# Patient Record
Sex: FEMALE | Race: WHITE | NOT HISPANIC OR LATINO | Employment: FULL TIME | URBAN - METROPOLITAN AREA
[De-identification: names, ages, dates, MRNs, and addresses within clinical notes are randomized per-mention and may not be internally consistent; named-entity substitution may affect disease eponyms.]

---

## 2021-12-20 PROBLEM — M85.89 OSTEOPENIA OF MULTIPLE SITES: Status: ACTIVE | Noted: 2021-12-20

## 2021-12-20 PROBLEM — C50.911 MALIGNANT NEOPLASM OF RIGHT FEMALE BREAST (HCC): Status: ACTIVE | Noted: 2021-12-20

## 2021-12-20 PROBLEM — M15.0 PRIMARY GENERALIZED (OSTEO)ARTHRITIS: Status: ACTIVE | Noted: 2021-12-20

## 2021-12-20 PROBLEM — I73.00 RAYNAUD'S PHENOMENON WITHOUT GANGRENE: Status: ACTIVE | Noted: 2021-12-20

## 2021-12-20 PROBLEM — M35.00 SJOGREN'S SYNDROME WITHOUT EXTRAGLANDULAR INVOLVEMENT (HCC): Status: ACTIVE | Noted: 2021-12-20

## 2022-06-23 PROBLEM — M18.0 PRIMARY OSTEOARTHRITIS OF BOTH FIRST CARPOMETACARPAL JOINTS: Status: ACTIVE | Noted: 2022-06-23

## 2023-07-06 PROBLEM — Z79.811 LONG TERM (CURRENT) USE OF AROMATASE INHIBITORS: Status: ACTIVE | Noted: 2023-07-06

## 2024-01-09 ENCOUNTER — TELEPHONE (OUTPATIENT)
Age: 59
End: 2024-01-09

## 2024-01-12 ENCOUNTER — TELEPHONE (OUTPATIENT)
Age: 59
End: 2024-01-12

## 2024-01-12 DIAGNOSIS — M81.0 SENILE OSTEOPOROSIS: ICD-10-CM

## 2024-01-12 NOTE — TELEPHONE ENCOUNTER
Caller: Marielos @ [facility]     Doctor: Delmis Napier      Reason for call: Marielos calling in and was told last month not to send Prolia injection over to the [place] office . But the patient called the Pharmacy for her injection and wants to continue to use [facility] pharmacy .    Please reach out to Marielos at the [facility]       Call back#: [phone]

## 2024-01-16 NOTE — TELEPHONE ENCOUNTER
Marielos from [facility] called asking for a further update on this matter for patients Prolia injection. She would appreciate your return call as soon as possible. Thank you    Marielos  #[phone]

## 2024-01-17 RX ORDER — DENOSUMAB 60 MG/ML
60 INJECTION SUBCUTANEOUS
Qty: 1 ML | Refills: 1 | Status: SHIPPED | OUTPATIENT
Start: 2024-01-17 | End: 2024-07-16

## 2024-01-17 NOTE — TELEPHONE ENCOUNTER
Caller: Shai     Doctor: Field     Reason for call: Shai called in regarding her appointment and her medication .  Shai is pretty upset that she is scheduled tomorrow and she's having issues getting her Prolia medication . I reached out to the clinical team and sharon was able to take the call and answer shai questions

## 2024-01-17 NOTE — TELEPHONE ENCOUNTER
Patient is calling to let us know that they wanted to know if their prolia shot is at the office please advise the patient

## 2024-01-17 NOTE — TELEPHONE ENCOUNTER
Spoke with Bayron and explained the situation. Informed patient she is able to keep her appointment tomorrow.

## 2024-01-17 NOTE — TELEPHONE ENCOUNTER
Called pharmacy and spoke with Marielos - she states the original prescription from December was cancel because they were informed we couldn't accept [facility] patient. I did inform her that we are able to accept the medication if it was shipped to our office.   She is requesting a new prescription sent over so they can process. The medication. Once the med is escribed I will call the patient.    Requested Prescriptions     Pending Prescriptions Disp Refills    denosumab (Prolia) 60 mg/mL 1 mL 1     Sig: Inject 1 mL (60 mg total) under the skin every 6 (six) months for 2 doses

## 2024-01-18 ENCOUNTER — OFFICE VISIT (OUTPATIENT)
Age: 59
End: 2024-01-18

## 2024-01-18 VITALS
TEMPERATURE: 97.3 F | BODY MASS INDEX: 20.79 KG/M2 | OXYGEN SATURATION: 97 % | DIASTOLIC BLOOD PRESSURE: 70 MMHG | WEIGHT: 140.4 LBS | HEIGHT: 69 IN | HEART RATE: 69 BPM | SYSTOLIC BLOOD PRESSURE: 114 MMHG

## 2024-01-18 DIAGNOSIS — M18.0 PRIMARY OSTEOARTHRITIS OF BOTH FIRST CARPOMETACARPAL JOINTS: ICD-10-CM

## 2024-01-18 DIAGNOSIS — Z79.811 LONG TERM (CURRENT) USE OF AROMATASE INHIBITORS: ICD-10-CM

## 2024-01-18 DIAGNOSIS — C50.911 MALIGNANT NEOPLASM OF RIGHT FEMALE BREAST, UNSPECIFIED ESTROGEN RECEPTOR STATUS, UNSPECIFIED SITE OF BREAST (HCC): ICD-10-CM

## 2024-01-18 DIAGNOSIS — I73.00 RAYNAUD'S PHENOMENON WITHOUT GANGRENE: ICD-10-CM

## 2024-01-18 DIAGNOSIS — M15.0 PRIMARY GENERALIZED (OSTEO)ARTHRITIS: ICD-10-CM

## 2024-01-18 DIAGNOSIS — M35.00 SJOGREN'S SYNDROME WITHOUT EXTRAGLANDULAR INVOLVEMENT (HCC): ICD-10-CM

## 2024-01-18 DIAGNOSIS — M85.89 OSTEOPENIA OF MULTIPLE SITES: Primary | ICD-10-CM

## 2024-01-18 RX ORDER — MIRABEGRON 50 MG/1
50 TABLET, FILM COATED, EXTENDED RELEASE ORAL DAILY
COMMUNITY
Start: 2023-09-14 | End: 2024-09-13

## 2024-01-18 RX ORDER — ANASTROZOLE 1 MG/1
1 TABLET ORAL DAILY
COMMUNITY
Start: 2023-10-18

## 2024-01-18 NOTE — TELEPHONE ENCOUNTER
[facility] called in regards to Pts Prolia Injection. I transferred the call directly to the [place] Office. Thank you.

## 2024-01-18 NOTE — PROGRESS NOTES
"Assessment/Plan:    Bayron Parsons came into the [facility] today 01/18/24 to receive Prolia injection.      Verbal consent obtained.  Consent given by: patient    patient states patient has been medically healthy with no underlining concerns/complications.      Bayron Parsons presents with n/a symptoms today.       All insturctions were reviewed with the patient.    If the patient should have any questions/concerns, advised patient to contacted [facility].       Subjective:     History provided by: patient    Patient ID: Bayron Parsons is a 58 y.o. female      Objective:    Vitals:    01/18/24 0731   BP: 114/70   BP Location: Left arm   Patient Position: Sitting   Cuff Size: Adult   Pulse: 69   Temp: (!) 97.3 °F (36.3 °C)   TempSrc: Tympanic   SpO2: 97%   Weight: 63.7 kg (140 lb 6.4 oz)   Height: 5' 8.5\" (1.74 m)       Patient tolerated the injection well without any complications.  Injection site/s left upper arm.  Medication was provided by Specialty pharmacy - [facility].    Patient signed consent form yes   Patient signed ABN form yes (If no patient is not a medicare patient).   Patient waited 15 minutes after injection yes (This only applies to patient's receiving first time injection).       Last Visit: 1/9/2024  Next visit:1/18/2024    "

## 2024-01-18 NOTE — PATIENT INSTRUCTIONS
Call the office if you need thumb injections.  Otherwise I will see you yearly and you can follow-up with the medical assistant for your every 6-month Prolia.  Get lab work 2 weeks before the office visit.

## 2024-01-20 NOTE — ASSESSMENT & PLAN NOTE
Sicca symptoms persist.  She is using Restasis for dry eye syndrome and PreviDent toothpaste with Biotene gel at night for xerostomia.  She does follow-up with the dentist every 4 months.  Continue to monitor for additional signs and symptoms of connective tissue disease.  Note complete MOISES profile negative 12/15/2022.

## 2024-01-20 NOTE — ASSESSMENT & PLAN NOTE
Raynaud's stable with no digital ulcerations.  Encourage cold protection.  Follow-up comprehensive MOISES profile was negative.  Continue to monitor.

## 2024-01-20 NOTE — ASSESSMENT & PLAN NOTE
Continue Prolia for a total of 10 years while on aromatase inhibitor being used for breast cancer prevention.  Monitor osteopenia with BMD every 2 years.  Most recent DEXA 11/30/2023 normal.  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  Continue calcium and vitamin D supplement and home exercise program as tolerated.  Monitor serum calcium and kidney function prior to each Prolia injection.  She is due for Prolia injection at this visit.  Follow-up 6 months with medical assistant for next Prolia injection with plans for follow-up office visit with Prolia injection in 12 months.

## 2024-01-20 NOTE — ASSESSMENT & PLAN NOTE
Patient continues on aromatase inhibitor with plans for 10 years of treatment.  She will continue Prolia while she is on aromatase inhibitor with plans to transition off once treatment is completed with aromatase inhibitor.  We will use bisphosphonate to transition off Prolia at the time of completion of aromatase inhibitor therapy.  Follow-up oncology as scheduled.

## 2024-01-20 NOTE — ASSESSMENT & PLAN NOTE
Continue Arimidex to complete 10 years of therapy.  Continue Prolia for prevention of bone loss while on Arimidex.  Continue to monitor her BMD every 2 years.  She will be due in November 2025. Continue calcium and vitamin D supplement.  Continue home exercise program as tolerated.

## 2024-01-20 NOTE — ASSESSMENT & PLAN NOTE
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  Symptoms have been relatively stable since her last set of injections in March 2023.  Patient will call should she need steroid injection CMC joints.  Continue symptomatic relief as needed.  Continue to monitor.

## 2024-04-29 ENCOUNTER — TELEPHONE (OUTPATIENT)
Dept: RHEUMATOLOGY | Facility: CLINIC | Age: 59
End: 2024-04-29

## 2024-06-28 DIAGNOSIS — M81.0 SENILE OSTEOPOROSIS: ICD-10-CM

## 2024-06-28 RX ORDER — DENOSUMAB 60 MG/ML
60 INJECTION SUBCUTANEOUS
Qty: 1 ML | Refills: 1 | Status: SHIPPED | OUTPATIENT
Start: 2024-06-28 | End: 2024-12-26

## 2024-07-02 DIAGNOSIS — M81.0 SENILE OSTEOPOROSIS: ICD-10-CM

## 2024-07-02 RX ORDER — DENOSUMAB 60 MG/ML
60 INJECTION SUBCUTANEOUS
Qty: 1 ML | Refills: 1 | OUTPATIENT
Start: 2024-07-02 | End: 2024-12-30

## 2024-07-02 NOTE — TELEPHONE ENCOUNTER
Called patient & left message - needs to change her appointment date from 7/22 to 7/23. No MA in office on 7/22 for injections.

## 2024-07-02 NOTE — TELEPHONE ENCOUNTER
No auth is req for prolia please send script into pharmacy for up coming appt on 7/22/24.     Requested Prescriptions     Pending Prescriptions Disp Refills    denosumab (Prolia) 60 mg/mL 1 mL 1     Sig: Inject 1 mL (60 mg total) under the skin every 6 (six) months for 2 doses

## 2024-07-02 NOTE — TELEPHONE ENCOUNTER
Patient returned call stating that she will be going to [facility] for all future Rheumatology appts due to insurance purposes and wanted to cancel her appt for this month.

## 2025-02-14 NOTE — PROGRESS NOTES
Assessment/Plan:    Osteopenia of multiple sites  Continue Prolia for a total of 10 years while on aromatase inhibitor being used for breast cancer prevention.  Monitor osteopenia with BMD every 2 years.  Most recent DEXA 11/30/2023 normal.  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  Continue calcium and vitamin D supplement and home exercise program as tolerated.  Monitor serum calcium and kidney function prior to each Prolia injection.  She is due for Prolia injection at this visit.  Follow-up 6 months with medical assistant for next Prolia injection with plans for follow-up office visit with Prolia injection in 12 months.     Primary generalized (osteo)arthritis  Primary generalized osteoarthritis with moderately advanced osteoarthritis bilateral first CMC joints.  Symptoms have been relatively stable since her last set of injections in March 2023.  Patient will call should she need steroid injection CMC joints.  Continue symptomatic relief as needed.  Continue to monitor.    Raynaud's phenomenon without gangrene  Raynaud's stable with no digital ulcerations.  Encourage cold protection.  Follow-up comprehensive MOISES profile was negative.  Continue to monitor.    Sjogren's syndrome without extraglandular involvement (HCC)  Sicca symptoms persist.  She is using Restasis for dry eye syndrome and PreviDent toothpaste with Biotene gel at night for xerostomia.  She does follow-up with the dentist every 4 months.  Continue to monitor for additional signs and symptoms of connective tissue disease.  Note complete MOISES profile negative 12/15/2022.    Long term (current) use of aromatase inhibitors  Patient continues on aromatase inhibitor with plans for 10 years of treatment.  She will continue Prolia while she is on aromatase inhibitor with plans to transition off once treatment is completed with aromatase inhibitor.  We will use bisphosphonate to transition off Prolia at the time of completion of aromatase inhibitor therapy.   [facility]  Discharge Summary      Patient Name: Girish Haley  MRN: [id number]  NELSON: [id number]  Patient Class: IP- Inpatient  Admission Date: 2/10/2025  Hospital Length of Stay: 2 days  Discharge Date and Time: 2/13/2025  3:11 PM  Attending Physician: Rosey att. providers found   Discharging Provider: Jamie Newby MD  Primary Care Provider: Rosey, Primary Doctor    Primary Care Team: Networked reference to record PCT     HPI:   Girish Haley is a 50-year-old male with has not seen a doctor in years, who presents with left-sided pain.    Patient reports that he awoke earlier this morning, coughed and heard a pop on his left lower abdomen and hip region.  He says the pain has worsened with movement.  He says this has happened before.  He has not seen a physician in years.  He works in construction like conditions.  He also reports symptoms of polyuria and increased appetite.  He presents for further evaluation.    Triage vitals significant for elevated blood pressures.  Physical examination significant for tenderness to palpation in the left lower abdominal quadrant and hip area.  Review of systems significant for abdominal/hip pain, polyuria, increased thirst.    CBC was fairly unremarkable.  CMP significant for hypoglycemia to the 700s, pseudohyponatremia, CO2 less than 10, BARBIE.  Hemoglobin A1c of 14.  Flu and influenza negative.  Beta hydroxybutyrate was elevated.    Chest x-ray without signs of pneumonia.    EKG with normal sinus rhythm.    Patient meets criteria for DKA and admitted for further management.      * No surgery found *      Hospital Course:   Initial workup revealed diabetic ketoacidosis with a glucose level in the 700s, pseudohyponatremia, metabolic acidosis, and acute kidney injury. Hemoglobin A1c was significantly elevated at 14%. He was admitted for DKA management and started on an insulin drip with subsequent transition to subcutaneous insulin therapy.  During  "hospitalization, BARBIE improved with IV fluids, and potassium was repleted. He was found to have severe hyperlipidemia with triglycerides >796 with initiation of a high-intensity statin. He was educated on diabetes management, provided with insulin supplies, and instructed to establish care with a primary provider for long-term follow-up. His condition stabilized, and he tolerated oral intake well. He will be discharged with an updated medication regimen and outpatient referrals.     Goals of Care Treatment Preferences:  Code Status: Full Code      SDOH Screening:  The patient was screened for food insecurity, housing instability, transportation needs, utility difficulties, and interpersonal safety. The social determinant(s) of health identified as a concern this admission are:  Housing instability        Social Drivers of Health with Concerns     Housing Stability: High Risk (2/13/2025)   Transportation Needs: Patient Declined (2/10/2025)        Consults:   Consults (From admission, onward)          Status Ordering Provider     Inpatient consult to Registered Dietitian/Nutritionist  Once        Provider:  (Not yet assigned)    Completed FELECIA PADGETT     Inpatient consult to Diabetes educator  Once        Provider:  (Not yet assigned)    Completed FELECIA PADGETT            * DKA (diabetic ketoacidosis)  Patient's FSGs are controlled on current medication regimen.  Last A1c reviewed-   Lab Results   Component Value Date    HGBA1C 14.0 (H) 02/10/2025     Most recent fingerstick glucose reviewed-   No results for input(s): "POCTGLUCOSE" in the last 24 hours.    Current correctional scale   insulin drip  Maintain anti-hyperglycemic dose as follows-   Antihyperglycemics (From admission, onward)      None          Hold Oral hypoglycemics while patient is in the hospital.    Currently on SSI and long acting insulin - titrate up as needed  Patient will need a glucometer and supplies, insulin pens, insulin pen " Follow-up oncology as scheduled.    Malignant neoplasm of right female breast (HCC)  Continue Arimidex to complete 10 years of therapy.  Continue Prolia for prevention of bone loss while on Arimidex.  Continue to monitor her BMD every 2 years.  She will be due in November 2025. Continue calcium and vitamin D supplement.  Continue home exercise program as tolerated.         Problem List Items Addressed This Visit     Malignant neoplasm of right female breast (HCC)     Continue Arimidex to complete 10 years of therapy.  Continue Prolia for prevention of bone loss while on Arimidex.  Continue to monitor her BMD every 2 years.  She will be due in November 2025. Continue calcium and vitamin D supplement.  Continue home exercise program as tolerated.         Relevant Medications    anastrozole (ARIMIDEX) 1 mg tablet    Osteopenia of multiple sites - Primary     Continue Prolia for a total of 10 years while on aromatase inhibitor being used for breast cancer prevention.  Monitor osteopenia with BMD every 2 years.  Most recent DEXA 11/30/2023 normal.  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  Continue calcium and vitamin D supplement and home exercise program as tolerated.  Monitor serum calcium and kidney function prior to each Prolia injection.  She is due for Prolia injection at this visit.  Follow-up 6 months with medical assistant for next Prolia injection with plans for follow-up office visit with Prolia injection in 12 months.          Relevant Medications    denosumab (PROLIA) subcutaneous injection 60 mg    Other Relevant Orders    Basic metabolic panel    Raynaud's phenomenon without gangrene     Raynaud's stable with no digital ulcerations.  Encourage cold protection.  Follow-up comprehensive MOISES profile was negative.  Continue to monitor.         Sjogren's syndrome without extraglandular involvement ([facility])     Sicca symptoms persist.  She is using Restasis for dry eye syndrome and PreviDent toothpaste with Biotene  gel at night for xerostomia.  She does follow-up with the dentist every 4 months.  Continue to monitor for additional signs and symptoms of connective tissue disease.  Note complete MOISES profile negative 12/15/2022.         Primary generalized (osteo)arthritis     Primary generalized osteoarthritis with moderately advanced osteoarthritis bilateral first CMC joints.  Symptoms have been relatively stable since her last set of injections in March 2023.  Patient will call should she need steroid injection CMC joints.  Continue symptomatic relief as needed.  Continue to monitor.         Primary osteoarthritis of both first carpometacarpal joints    Long term (current) use of aromatase inhibitors     Patient continues on aromatase inhibitor with plans for 10 years of treatment.  She will continue Prolia while she is on aromatase inhibitor with plans to transition off once treatment is completed with aromatase inhibitor.  We will use bisphosphonate to transition off Prolia at the time of completion of aromatase inhibitor therapy.  Follow-up oncology as scheduled.         Relevant Medications    denosumab (PROLIA) subcutaneous injection 60 mg           Reviewed records, labs, and imaging with the patient in detail.  Counseled patient.  Discussion regarding my findings and recommendations.  Office visit with documentation 25 min.    Subjective:      Patient ID: Bayron Parsons is a 58 y.o. female.    Patient with history breast cancer status post bilateral mastectomy with reconstruction, BRCA 2 positive, who had continued on aromatase inhibitors until October 2021 at which time she was taken off due to persistent arthralgias.  She had been on Arimidex for approximately 6 years.  She is monitored by Hematology-Oncology.  Arthralgias persisted off Arimidex so she was restarted on it in January or February last year. Patient has been on Prolia for prevention of osteopenia/osteoporosis from aromatase inhibitors.  Other medical  needles, outpatient diabetes care and education referral, and follow up for diabetes management.  Will need to establish with PCP    Hyperlipidemia  Severe hyperlipidemia with triglycerides >796 and LDL uncalculated due to high triglycerides. High risk for pancreatitis and cardiovascular disease.    Dx:  Liver function tests to assess hepatic involvement    Tx:  High-intensity statin    Hypokalemia  Patient's most recent potassium results are listed below.   Recent Labs     02/12/25  0830 02/13/25 0522   K 4.2 4.0       Plan  - Replete potassium per protocol  - Monitor potassium Daily  - Patient's hypokalemia is improving    BARBIE (acute kidney injury)  BARBIE is likely due to pre-renal azotemia due to intravascular volume depletion. Baseline creatinine is  1.0 . Most recent creatinine and eGFR are listed below.  Recent Labs     02/12/25  0830 02/13/25 0522   CREATININE 1.0 1.0   EGFRNORACEVR >60 >60        Plan  - BARBIE is resolved  - Avoid nephrotoxins and renally dose meds for GFR listed above  - Monitor urine output, serial BMP, and adjust therapy as needed  - likely BARBIE from polyuria from DKA    Hypertension  Patient's blood pressure range in the last 24 hours was: No data recorded.The patient's inpatient anti-hypertensive regimen is listed below:  Current Antihypertensives  amlodipine (NORVASC) tablet, Daily, Oral  carvedilol (COREG) tablet, 2 times daily, Oral    Plan  - BP is uncontrolled, will adjust as follows:  Start Coreg, started amlodipine, start losartan  - we will need to see PCP and will be given these medications at discharge      Final Active Diagnoses:    Diagnosis Date Noted POA    PRINCIPAL PROBLEM:  DKA (diabetic ketoacidosis) [E11.10] 02/10/2025 Yes    Tobacco dependency [F17.200] 02/12/2025 Unknown    Hyperlipidemia [E78.5] 02/12/2025 Yes    Hypertension [I10] 02/10/2025 Yes    BARBIE (acute kidney injury) [N17.9] 02/10/2025 Yes    Hypokalemia [E87.6] 02/10/2025 Yes      Problems Resolved During  "this Admission:       Discharged Condition: good    Disposition: Home or Self Care    Follow Up:   Follow-up Information       [facility] Follow up on 2/17/2025.    Specialties: Behavioral Health, Psychiatry, Family Medicine  Why: at 4:00pm; hospital follow up appointment with BRANDON Chew; Bring proof of current address and proof of income if herself or family member (check stub) is no insurance.  Bring Photo ID and Ins Card and Hospital Discharge papers  Contact information:  [address] [id number] [phone]                           Patient Instructions:      Ambulatory referral/consult to Diabetes Education   Standing Status: Future   Referral Priority: Routine Referral Type: Consultation   Referral Reason: Specialty Services Required   Requested Specialty: Diabetes   Number of Visits Requested: 1 Expiration Date: 02/12/26       Significant Diagnostic Studies: Labs: All labs within the past 24 hours have been reviewed    Pending Diagnostic Studies:       None           Medications:  Reconciled Home Medications:      Medication List        START taking these medications      amLODIPine 5 MG tablet  Commonly known as: NORVASC  Take 1 tablet (5 mg total) by mouth once daily.     atorvastatin 40 MG tablet  Commonly known as: LIPITOR  Take 1 tablet (40 mg total) by mouth once daily.     BD ULTRA-FINE JACEY PEN NEEDLE 32 gauge x 5/32" Ndle  Generic drug: pen needle, diabetic  1 each by Misc.(Non-Drug; Combo Route) route 3 (three) times daily with meals.     carvediloL 6.25 MG tablet  Commonly known as: COREG  Take 1 tablet (6.25 mg total) by mouth 2 (two) times daily.     * insulin syringe-needle U-100 1 mL 31 gauge x 15/64" Syrg  1 each by Misc.(Non-Drug; Combo Route) route 3 (three) times daily with meals.     * insulin syringe-needle U-100 1 mL 31 gauge x 5/16 Syrg  1 each by Misc.(Non-Drug; Combo Route) route 3 (three) times daily with meals.     LANTUS SOLOSTAR U-100 INSULIN " problems include primary generalized osteoarthritis, mild lumbar spondylosis, Raynaud's likely primary with no discrete connective tissue disease, sicca symptoms rule out Sjogren's primary versus secondary, history carpal tunnel syndrome with thenar atrophy, sleep disturbance, spasm thoracolumbar paraspinals.  Patient is status post bilateral first CMC injections 3/29/2023 with success with no significant return of pain.  She does have thumb spica splints but finds them cumbersome.  No joint swelling.  Intermittent carpal tunnel symptoms which respond to nighttime wrist splints. Minimal left hip pain with first few steps walking. Sleep disturbance with early awakening and chronic fatigue. She notes less awakening due to urinary sx's/nocturia on Myrbetriq.  Mild hair thinning stabilized.  Chronic dry eyes on Restasis.  Dry mouth treated with Prevident toothpaste and frequent dental cleanings every 4 months. She uses Biotene gel at night.  Constipation generally quiescent with medication and stool softeners.  Raynaud's has been relatively quiescent.     DEXA dated 11/30/2023 significant for spine T-score +0.3.  Right total hip +0.7.  Femoral neck -0.5.  Right distal forearm -0.3.  Review of DEXA dated 11/22/2021 reveals lumbar spine-0.8 with an increase of 10.1% as compared to the prior study.  Total hip +0.7 with an increase of 2.8% as compared to the prior study.  Femoral neck +0.4 with an increase of 1.2% as compared to the prior study.  1/3 distal right forearm +0.1 with an increase of 1.2% as compared to the prior study.  Note spine T-score -1.2 in 2017.    Lab work dated 1/12/2024 significant for calcium 9.3.  Estimated GFR 81.  Vitamin D 71.  MOISES comprehensive panel entirely negative 12/15/2022.    Patient does have a family history of pancreatic cancer in her mother.  She did have an MRI of the abdomen 3/26/2021 which was unremarkable.  She was seen by  who felt she should get a follow-up MRI of the  100 unit/mL (3 mL) Inpn pen  Generic drug: insulin glargine U-100 (Lantus)  Inject 20 Units into the skin once daily.     NovoLOG Flexpen U-100 Insulin 100 unit/mL (3 mL) Inpn pen  Generic drug: insulin aspart U-100  Inject 15 Units into the skin 3 (three) times daily.     TRUE METRIX GLUCOSE METER Misc  Generic drug: blood-glucose meter  To check blood glucose three times daily     TRUE METRIX GLUCOSE TEST STRIP Strp  Generic drug: blood sugar diagnostic  To check Blood Glucose  3 times daily,     TRUEPLUS LANCETS 30 gauge Misc  Generic drug: lancets  To check Blood Glucose  3 times daily,           * This list has 2 medication(s) that are the same as other medications prescribed for you. Read the directions carefully, and ask your doctor or other care provider to review them with you.                CONTINUE taking these medications      acetaminophen 500 MG tablet  Commonly known as: TYLENOL  Take 500 mg by mouth as needed for Pain.     aspirin 81 MG EC tablet  Commonly known as: ECOTRIN  Take 81 mg by mouth as needed for Pain.     aspirin-acetaminophen-caffeine 250-250-65 mg 250-250-65 mg per tablet  Commonly known as: EXCEDRIN MIGRAINE  Take 1 tablet by mouth every 6 (six) hours as needed for Pain.     ibuprofen 200 MG tablet  Commonly known as: ADVIL,MOTRIN  Take 200 mg by mouth as needed for Pain.              Indwelling Lines/Drains at time of discharge:   Lines/Drains/Airways       None                   Time spent on the discharge of patient: 35 minutes         Jamie Newby MD  Department of Hospital Medicine  [place] [facility]   abdomen yearly.          Allergies  Allergies   Allergen Reactions   • Adhesive [Medical Tape] Other (See Comments)     Redness for 7-8 hours   • Paclitaxel Other (See Comments)     Sob,   hypertension   • Penicillins Rash     rash         Home Medications    Current Outpatient Medications:   •  anastrozole (ARIMIDEX) 1 mg tablet, Take 1 mg by mouth daily, Disp: , Rfl:   •  Calcium Carbonate (CALCIUM 500 PO), Take 1,000 mg by mouth in the morning, Disp: , Rfl:   •  Cholecalciferol (Vitamin D3) 50 MCG (2000 UT) capsule, Take by mouth, Disp: , Rfl:   •  cycloSPORINE (Restasis) 0.05 % ophthalmic emulsion, Administer 1 drop to both eyes every 12 (twelve) hours, Disp: , Rfl:   •  denosumab (Prolia) 60 mg/mL, Inject 1 mL (60 mg total) under the skin every 6 (six) months for 2 doses, Disp: 1 mL, Rfl: 1  •  Multiple Vitamins-Minerals (MULTIVITAMIN ADULTS PO), Take 1 capsule by mouth daily, Disp: , Rfl:   •  Myrbetriq 50 MG TB24, Take 50 mg by mouth daily, Disp: , Rfl:   •  Omega-3 Fatty Acids (FISH OIL PO), , Disp: , Rfl:   •  pyridoxine (VITAMIN B6) 50 mg tablet, Take 50 mg by mouth daily, Disp: , Rfl:   •  TURMERIC PO, Take by mouth, Disp: , Rfl:   •  Biotin 5 MG TABS, Take by mouth (Patient not taking: Reported on 1/18/2024), Disp: , Rfl:     Current Facility-Administered Medications:   •  denosumab (PROLIA) subcutaneous injection 60 mg, 60 mg, Subcutaneous, Q6 Months, Delmis Mathew MD, 60 mg at 01/18/24 0854    Past Medical History  Past Medical History:   Diagnosis Date   • Bilateral carpal tunnel syndrome    • BRCA positive    • Cancer (HCC)     Breast cancer   • Dry eye syndrome of both eyes    • Osteoarthritis    • Osteopenia    • Raynaud's syndrome without gangrene    • Sjogren's syndrome (HCC)    • Sleep disturbance        Past Surgical History   Past Surgical History:   Procedure Laterality Date   • APPENDECTOMY     • BILATERAL OOPHORECTOMY     • BREAST LUMPECTOMY     • BREAST RECONSTRUCTION Bilateral    •  "MASTECTOMY Bilateral        Family History   Family History   Problem Relation Age of Onset   • Breast cancer Family    • Prostate cancer Family    • Pancreatic cancer Family    • Hypertension Family    • COPD Family    • Parkinsonism Family    • Hyperlipidemia Family        The following portions of the patient's history were reviewed and updated as appropriate: allergies, current medications, past family history, past medical history, past social history, past surgical history, and problem list.    Review of Systems   Constitutional:  Positive for fatigue. Negative for chills and fever.        Mild chronic but stable fatigue.  History sleep disturbance with early awakening.  Hair thinning stabilized.   HENT:  Negative for ear pain and sore throat.         Dry mouth   Eyes:  Negative for pain and visual disturbance.        Dry eyes on Restasis.   Respiratory:  Negative for cough and shortness of breath.    Cardiovascular:  Negative for chest pain and palpitations.   Gastrointestinal:  Positive for constipation. Negative for abdominal pain and vomiting.   Genitourinary:  Negative for dysuria and hematuria.        Nocturia 2-3 times nightly.  No dysuria.   Musculoskeletal:  Positive for arthralgias. Negative for back pain.        Slight increase basal thumb joint arthralgias left>right.   Skin:  Negative for color change and rash.        Raynaud's stable.  No digital ulcerations.   Neurological:  Negative for seizures and syncope.   All other systems reviewed and are negative.        Objective:      /70 (BP Location: Left arm, Patient Position: Sitting, Cuff Size: Adult)   Pulse 69   Temp (!) 97.3 °F (36.3 °C) (Tympanic)   Ht 5' 8.5\" (1.74 m)   Wt 63.7 kg (140 lb 6.4 oz)   SpO2 97%   BMI 21.04 kg/m²          Physical Exam  Vitals reviewed.   Constitutional:       Appearance: Normal appearance.   HENT:      Head: Normocephalic.      Nose:      Comments: Nose and throat unremarkable  Eyes:      Extraocular " Movements: Extraocular movements intact.   Neck:      Comments: Without masses, thyromegaly, lymphadenopathy  Cardiovascular:      Rate and Rhythm: Regular rhythm.   Pulmonary:      Breath sounds: Normal breath sounds.   Abdominal:      Palpations: Abdomen is soft.   Musculoskeletal:      Cervical back: Neck supple.      Comments: Neck essentially full range of motion as do shoulders, elbows, and wrists.  Tinel's positive bilateral wrists.  Hands squaring 1st carpometacarpal joints bilaterally with no basal thumb tenderness.  Heberden's nodes and early Sesar's nodes noted.  Mild thenar atrophy right greater than left.  No synovitis noted.  Straight leg raising negative bilaterally.  Hips full range of motion with patellofemoral crepitus.  Ankles full range of motion.  Feet rearfoot hyperpronation with hallux valgus deformities, hammertoe deformities, and tailor's bunions.  No synovitis noted.   Skin:     General: Skin is warm.      Comments: Rosacea.  Mild nail ridging.   Neurological:      General: No focal deficit present.               This note was written in part using the assistance of the [facility] [employer] Direct [id number] microphone system. Those portions using this system have been dictated and not read.